# Patient Record
Sex: MALE | URBAN - METROPOLITAN AREA
[De-identification: names, ages, dates, MRNs, and addresses within clinical notes are randomized per-mention and may not be internally consistent; named-entity substitution may affect disease eponyms.]

---

## 2020-06-25 ENCOUNTER — APPOINTMENT (RX ONLY)
Dept: URBAN - METROPOLITAN AREA CLINIC 112 | Facility: CLINIC | Age: 50
Setting detail: DERMATOLOGY
End: 2020-06-25

## 2020-06-25 DIAGNOSIS — L91.8 OTHER HYPERTROPHIC DISORDERS OF THE SKIN: ICD-10-CM

## 2020-06-25 DIAGNOSIS — D22 MELANOCYTIC NEVI: ICD-10-CM

## 2020-06-25 DIAGNOSIS — L82.1 OTHER SEBORRHEIC KERATOSIS: ICD-10-CM

## 2020-06-25 DIAGNOSIS — Z71.89 OTHER SPECIFIED COUNSELING: ICD-10-CM

## 2020-06-25 PROBLEM — D22.71 MELANOCYTIC NEVI OF RIGHT LOWER LIMB, INCLUDING HIP: Status: ACTIVE | Noted: 2020-06-25

## 2020-06-25 PROBLEM — D22.72 MELANOCYTIC NEVI OF LEFT LOWER LIMB, INCLUDING HIP: Status: ACTIVE | Noted: 2020-06-25

## 2020-06-25 PROCEDURE — ? DIAGNOSIS COMMENT

## 2020-06-25 PROCEDURE — ? LIQUID NITROGEN (COSMETIC)

## 2020-06-25 PROCEDURE — ? SUNSCREEN RECOMMENDATIONS

## 2020-06-25 PROCEDURE — ? SKIN TAG REMOVAL (COSMETIC)

## 2020-06-25 PROCEDURE — 99202 OFFICE O/P NEW SF 15 MIN: CPT

## 2020-06-25 PROCEDURE — ? ADDITIONAL NOTES

## 2020-06-25 PROCEDURE — ? COUNSELING

## 2020-06-25 ASSESSMENT — LOCATION ZONE DERM
LOCATION ZONE: TRUNK
LOCATION ZONE: EYELID
LOCATION ZONE: FACE
LOCATION ZONE: LEG

## 2020-06-25 ASSESSMENT — LOCATION SIMPLE DESCRIPTION DERM
LOCATION SIMPLE: RIGHT CHEEK
LOCATION SIMPLE: RIGHT THIGH
LOCATION SIMPLE: LEFT SUPERIOR EYELID
LOCATION SIMPLE: LEFT THIGH
LOCATION SIMPLE: RIGHT SUPERIOR EYELID
LOCATION SIMPLE: GROIN

## 2020-06-25 ASSESSMENT — LOCATION DETAILED DESCRIPTION DERM
LOCATION DETAILED: RIGHT ANTERIOR DISTAL THIGH
LOCATION DETAILED: SUPRAPUBIC SKIN
LOCATION DETAILED: LEFT MEDIAL SUPERIOR EYELID
LOCATION DETAILED: LEFT ANTERIOR DISTAL THIGH
LOCATION DETAILED: LEFT LATERAL SUPERIOR EYELID
LOCATION DETAILED: RIGHT LATERAL SUPERIOR EYELID
LOCATION DETAILED: RIGHT MEDIAL MALAR CHEEK

## 2020-06-25 NOTE — PROCEDURE: DIAGNOSIS COMMENT
Comment: Cosmetic destruction pricing of $136 covers both seborrheic keratosis and skin tags
Detail Level: Generalized

## 2020-06-25 NOTE — PROCEDURE: LIQUID NITROGEN (COSMETIC)
Render Post-Care Instructions In Note?: no
Post-Care Instructions: I reviewed with the patient in detail post-care instructions. Patient is to wear sunprotection, and avoid picking at any of the treated lesions. Pt may apply Vaseline to crusted or scabbing areas.
Consent: The patient's consent was obtained including but not limited to risks of crusting, scabbing, blistering, scarring, darker or lighter pigmentary change, recurrence, incomplete removal and infection. The patient understands that the procedure is cosmetic in nature and is not covered by insurance.
Price (Use Numbers Only, No Special Characters Or $): 136
Detail Level: Detailed

## 2020-06-25 NOTE — HPI: EVALUATION OF SKIN LESION(S)
What Type Of Note Output Would You Prefer (Optional)?: Bullet Format
How Severe Are Your Spot(S)?: mild
Hpi Title: Evaluation of Skin Lesions
Additional History: Patient presents for his first full body skin exam.

## 2020-06-25 NOTE — PROCEDURE: SKIN TAG REMOVAL (COSMETIC)
Removed With: liquid nitrogen
Detail Level: Detailed
Consent: Written consent obtained and the risks of skin tag removal was reviewed with the patient including but not limited to bleeding, pigmentary change, infection, pain, and remote possibility of scarring.
Anesthesia Volume In Cc: 1

## 2020-06-25 NOTE — PROCEDURE: ADDITIONAL NOTES
Additional Notes: Gauze pad held to protect orbit and eyelids retracted away from orbit during procedure
Detail Level: Simple

## 2020-06-25 NOTE — HPI: SKIN LESION
Is This A New Presentation, Or A Follow-Up?: Skin Lesion
What Type Of Note Output Would You Prefer (Optional)?: Bullet Format
Is This A New Presentation, Or A Follow-Up?: Skin Lesions
Additional History: He believes these are skin tags and would like them treated today.